# Patient Record
Sex: FEMALE | Race: WHITE | Employment: UNEMPLOYED | URBAN - METROPOLITAN AREA
[De-identification: names, ages, dates, MRNs, and addresses within clinical notes are randomized per-mention and may not be internally consistent; named-entity substitution may affect disease eponyms.]

---

## 2021-05-07 ENCOUNTER — APPOINTMENT (OUTPATIENT)
Dept: GENERAL RADIOLOGY | Facility: HOSPITAL | Age: 46
DRG: 378 | End: 2021-05-07
Attending: EMERGENCY MEDICINE

## 2021-05-07 ENCOUNTER — HOSPITAL ENCOUNTER (INPATIENT)
Facility: HOSPITAL | Age: 46
LOS: 1 days | Discharge: HOME | DRG: 378 | End: 2021-05-08
Attending: EMERGENCY MEDICINE | Admitting: INTERNAL MEDICINE

## 2021-05-07 DIAGNOSIS — K92.2 ACUTE UPPER GI BLEED: ICD-10-CM

## 2021-05-07 DIAGNOSIS — D64.9 ANEMIA, UNSPECIFIED TYPE: Primary | ICD-10-CM

## 2021-05-07 DIAGNOSIS — D50.9 IRON DEFICIENCY ANEMIA, UNSPECIFIED IRON DEFICIENCY ANEMIA TYPE: ICD-10-CM

## 2021-05-07 DIAGNOSIS — K92.1 MELENA: ICD-10-CM

## 2021-05-07 PROCEDURE — 71045 X-RAY EXAM CHEST 1 VIEW: CPT | Performed by: EMERGENCY MEDICINE

## 2021-05-07 PROCEDURE — 99223 1ST HOSP IP/OBS HIGH 75: CPT | Performed by: INTERNAL MEDICINE

## 2021-05-07 RX ORDER — ONDANSETRON 2 MG/ML
4 INJECTION INTRAMUSCULAR; INTRAVENOUS EVERY 6 HOURS PRN
Status: DISCONTINUED | OUTPATIENT
Start: 2021-05-07 | End: 2021-05-07

## 2021-05-07 RX ORDER — POLYETHYLENE GLYCOL 3350 17 G/17G
17 POWDER, FOR SOLUTION ORAL DAILY PRN
Status: DISCONTINUED | OUTPATIENT
Start: 2021-05-07 | End: 2021-05-08

## 2021-05-07 RX ORDER — ACETAMINOPHEN 325 MG/1
650 TABLET ORAL EVERY 6 HOURS PRN
Status: DISCONTINUED | OUTPATIENT
Start: 2021-05-07 | End: 2021-05-08

## 2021-05-07 RX ORDER — ONDANSETRON 2 MG/ML
4 INJECTION INTRAMUSCULAR; INTRAVENOUS EVERY 6 HOURS PRN
Status: DISCONTINUED | OUTPATIENT
Start: 2021-05-07 | End: 2021-05-08

## 2021-05-07 RX ORDER — IBUPROFEN 200 MG
400 TABLET ORAL EVERY 8 HOURS PRN
Status: ON HOLD | COMMUNITY
End: 2021-05-08

## 2021-05-07 RX ORDER — MELATONIN
3 NIGHTLY PRN
Status: DISCONTINUED | OUTPATIENT
Start: 2021-05-07 | End: 2021-05-08

## 2021-05-07 RX ORDER — BISACODYL 10 MG
10 SUPPOSITORY, RECTAL RECTAL
Status: DISCONTINUED | OUTPATIENT
Start: 2021-05-07 | End: 2021-05-08

## 2021-05-07 RX ORDER — SODIUM PHOSPHATE, DIBASIC AND SODIUM PHOSPHATE, MONOBASIC 7; 19 G/133ML; G/133ML
1 ENEMA RECTAL ONCE AS NEEDED
Status: DISCONTINUED | OUTPATIENT
Start: 2021-05-07 | End: 2021-05-08

## 2021-05-07 RX ORDER — SODIUM CHLORIDE, SODIUM LACTATE, POTASSIUM CHLORIDE, CALCIUM CHLORIDE 600; 310; 30; 20 MG/100ML; MG/100ML; MG/100ML; MG/100ML
INJECTION, SOLUTION INTRAVENOUS CONTINUOUS
Status: DISCONTINUED | OUTPATIENT
Start: 2021-05-07 | End: 2021-05-08

## 2021-05-07 RX ORDER — DEXTROSE AND SODIUM CHLORIDE 5; .45 G/100ML; G/100ML
INJECTION, SOLUTION INTRAVENOUS CONTINUOUS
Status: ACTIVE | OUTPATIENT
Start: 2021-05-07 | End: 2021-05-07

## 2021-05-07 NOTE — CONSULTS
[facility]                       Gastroenterology [address] [facility]    Kamilah Goldsmith Patient Status:  Emergency    1975 MRN [id number]   Location [address] [facility] Attending Rodo Umanzor [facility] disease, or inflammatory bowel disease  ROS:  In addition to the pertinent positives described above:             Infectious Disease:  No chronic infections or recent fevers prior to the acute illness            Cardiovascular: No history of CAD, prior MI, obvious depression or anxiety  Labs:   Lab Results   Component Value Date    WBC 18.2 05/07/2021    HGB 8.1 05/07/2021    HCT 23.7 05/07/2021    .0 05/07/2021    CREATSERUM 0.89 05/07/2021    BUN 22 05/07/2021     05/07/2021    K 4.1 05/07/202 above nurse practitioner's history, physical exam, assessment and recommendations with the following modifications and/or additions:     Patient is a 54year old with a history of migraines, with GI consult for anemia and melena.  Pt had a COVID vaccination

## 2021-05-07 NOTE — H&P
[facility]  History and Physical     Amanda [facility] Patient Status:  Emergency    1975 MRN [id number]   Location 656 [facility] Attending Ruben Vallejo, [address] [facility] Day # 0 PCP No primary care provider on file. Known Problems Sister         Allergies:   Cefdinir                DIARRHEA, NAUSEA AND VOMITING    Medications:  No current facility-administered medications on file prior to encounter.   Norethindrone 0.35 MG Oral Tab, Take 1 tablet (0.35 mg total) by adarsh Kinase  No results for input(s): CK in the last 168 hours. Inflammatory Markers  No results for input(s): CRP, MATEO, LDH, DDIMER in the last 168 hours. Imaging: Imaging data reviewed in Epic. ASSESSMENT / PLAN:     1.  Acute UGIB - likely 2/2 incr

## 2021-05-07 NOTE — ED INITIAL ASSESSMENT (HPI)
Pt in c/o SOB and fatigue when doing simple tasks. Pt states she tries to get out of bed and move around and feels tired. Second COVID vaccine Sunday.   No hx blood clots

## 2021-05-07 NOTE — ED PROVIDER NOTES
Patient Seen in: [facility] Emergency Department      History   Patient presents with:  Dizziness  Difficulty Breathing    Stated Complaint: exertional dyspnea and dizziness    HPI/Subjective:   HPI  This is a 69-year-old female with no significant oriented x3, nontoxic appearing. SKIN: Pale, warm, and dry. HEENT:  Pupils equally round and reactive to light. Conjuctiva clear. Oropharynx is clear and moist.   Lungs: Clear to auscultation bilaterally with no rales, no retractions, and no wheezing. In process                 ANTIBODY SCREEN[[id number]]                                  In process                   Please view results for these tests on the individual orders.    [address] Dana[address] Campoverde (BLOOD TYPE)   ANTIBODY SCREEN   Agar GREG Peña Prescribed:  Current Discharge Medication List

## 2021-05-08 ENCOUNTER — ANESTHESIA (OUTPATIENT)
Dept: ENDOSCOPY | Facility: HOSPITAL | Age: 46
DRG: 378 | End: 2021-05-08

## 2021-05-08 ENCOUNTER — ANESTHESIA EVENT (OUTPATIENT)
Dept: ENDOSCOPY | Facility: HOSPITAL | Age: 46
DRG: 378 | End: 2021-05-08

## 2021-05-08 VITALS
DIASTOLIC BLOOD PRESSURE: 60 MMHG | SYSTOLIC BLOOD PRESSURE: 117 MMHG | TEMPERATURE: 98 F | RESPIRATION RATE: 20 BRPM | OXYGEN SATURATION: 100 % | HEART RATE: 105 BPM

## 2021-05-08 PROCEDURE — 0DB68ZX EXCISION OF STOMACH, VIA NATURAL OR ARTIFICIAL OPENING ENDOSCOPIC, DIAGNOSTIC: ICD-10-PCS | Performed by: INTERNAL MEDICINE

## 2021-05-08 PROCEDURE — 0DB58ZX EXCISION OF ESOPHAGUS, VIA NATURAL OR ARTIFICIAL OPENING ENDOSCOPIC, DIAGNOSTIC: ICD-10-PCS | Performed by: INTERNAL MEDICINE

## 2021-05-08 PROCEDURE — 0D748ZZ DILATION OF ESOPHAGOGASTRIC JUNCTION, VIA NATURAL OR ARTIFICIAL OPENING ENDOSCOPIC: ICD-10-PCS | Performed by: INTERNAL MEDICINE

## 2021-05-08 PROCEDURE — 99239 HOSP IP/OBS DSCHRG MGMT >30: CPT | Performed by: INTERNAL MEDICINE

## 2021-05-08 RX ORDER — NALOXONE HYDROCHLORIDE 0.4 MG/ML
80 INJECTION, SOLUTION INTRAMUSCULAR; INTRAVENOUS; SUBCUTANEOUS AS NEEDED
Status: CANCELLED | OUTPATIENT
Start: 2021-05-08 | End: 2021-05-08

## 2021-05-08 RX ORDER — HYDROCODONE BITARTRATE AND ACETAMINOPHEN 10; 325 MG/1; MG/1
2 TABLET ORAL AS NEEDED
Status: CANCELLED | OUTPATIENT
Start: 2021-05-08

## 2021-05-08 RX ORDER — HYDROCODONE BITARTRATE AND ACETAMINOPHEN 10; 325 MG/1; MG/1
1 TABLET ORAL AS NEEDED
Status: CANCELLED | OUTPATIENT
Start: 2021-05-08

## 2021-05-08 RX ORDER — PANTOPRAZOLE SODIUM 40 MG/1
40 TABLET, DELAYED RELEASE ORAL
Status: DISCONTINUED | OUTPATIENT
Start: 2021-05-08 | End: 2021-05-08

## 2021-05-08 RX ORDER — SODIUM CHLORIDE, SODIUM LACTATE, POTASSIUM CHLORIDE, CALCIUM CHLORIDE 600; 310; 30; 20 MG/100ML; MG/100ML; MG/100ML; MG/100ML
INJECTION, SOLUTION INTRAVENOUS CONTINUOUS
Status: CANCELLED | OUTPATIENT
Start: 2021-05-08

## 2021-05-08 RX ORDER — SODIUM CHLORIDE 9 MG/ML
INJECTION, SOLUTION INTRAVENOUS CONTINUOUS PRN
Status: DISCONTINUED | OUTPATIENT
Start: 2021-05-08 | End: 2021-05-08 | Stop reason: SURG

## 2021-05-08 RX ORDER — PANTOPRAZOLE SODIUM 40 MG/1
40 TABLET, DELAYED RELEASE ORAL
Qty: 30 TABLET | Refills: 0 | Status: SHIPPED | OUTPATIENT
Start: 2021-05-08

## 2021-05-08 RX ORDER — METOCLOPRAMIDE HYDROCHLORIDE 5 MG/ML
10 INJECTION INTRAMUSCULAR; INTRAVENOUS AS NEEDED
Status: CANCELLED | OUTPATIENT
Start: 2021-05-08 | End: 2021-05-08

## 2021-05-08 RX ORDER — LIDOCAINE HYDROCHLORIDE 10 MG/ML
INJECTION, SOLUTION EPIDURAL; INFILTRATION; INTRACAUDAL; PERINEURAL AS NEEDED
Status: DISCONTINUED | OUTPATIENT
Start: 2021-05-08 | End: 2021-05-08 | Stop reason: SURG

## 2021-05-08 RX ORDER — ONDANSETRON 2 MG/ML
4 INJECTION INTRAMUSCULAR; INTRAVENOUS AS NEEDED
Status: CANCELLED | OUTPATIENT
Start: 2021-05-08 | End: 2021-05-08

## 2021-05-08 RX ORDER — HYDROMORPHONE HYDROCHLORIDE 1 MG/ML
0.4 INJECTION, SOLUTION INTRAMUSCULAR; INTRAVENOUS; SUBCUTANEOUS EVERY 5 MIN PRN
Status: CANCELLED | OUTPATIENT
Start: 2021-05-08 | End: 2021-05-08

## 2021-05-08 RX ADMIN — SODIUM CHLORIDE: 9 INJECTION, SOLUTION INTRAVENOUS at 08:31:00

## 2021-05-08 RX ADMIN — LIDOCAINE HYDROCHLORIDE 25 MG: 10 INJECTION, SOLUTION EPIDURAL; INFILTRATION; INTRACAUDAL; PERINEURAL at 08:31:00

## 2021-05-08 NOTE — ANESTHESIA PREPROCEDURE EVALUATION
PRE-OP EVALUATION    Patient Name: Shilpi Hackett    Admit Diagnosis: Acute upper GI bleed [K92.2]  Anemia, unspecified type [D64.9]    Pre-op Diagnosis: Melena [K92.1]  Iron deficiency anemia, unspecified iron deficiency anemia type [D50.9]    ESOPHAG reviewed.     Anesthetic Complications           GI/Hepatic/Renal      (+) GERD                           Cardiovascular                (+) obesity                                       Endo/Other                                  Pulmonary

## 2021-05-08 NOTE — OPERATIVE REPORT
EGD operative report  Patient Name: Dorene Wan  Procedure: Esophagogastroduodenoscopy with cold forceps biopsy  Date of procedure: 5/8/2021    Indication: Melena, acute blood loss anemia  Attending: Chantal Moulton M.D.   Consent:  The eduardo esteban from the incisors. Resistance to passage of the endoscope was noted. With mild pressure, the endoscope was able to be advanced into the stomach. This left a small linear rent at the EGJxn, consistent with dilation of the stricture with the endoscope.  Co

## 2021-05-08 NOTE — PLAN OF CARE
Patient received this evening alert and oriented x 4, lungs clear on room air, no cough or SOB noted, abdomen soft, bowel sounds present, passing flatus, voiding adequate amounts, asymptomatic, patient medicated for complaints of headache, IVF infusing, me

## 2021-05-08 NOTE — DISCHARGE SUMMARY
[facility]  DISCHARGE SUMMARY     Davide Andrew Patient Status:  Inpatient    1975 MRN [id number]   [facility] 4NW-A Attending Malinda Early Day # 1 PCP None Pcp     Date of Admission: 2021  Date of D recommendations (brief descriptions):  • As above    Lab/Test results pending at Discharge:   · Final pathology results    Consultants:  • GI    Discharge Medication List:     Discharge Medications      START taking these medications      Instructions Pres edema.  -----------------------------------------------------------------------------------------------  PATIENT DISCHARGE INSTRUCTIONS: See electronic chart    Madhu Ramos, DO    Time spent:  > 30 minutes

## 2021-05-08 NOTE — PLAN OF CARE
Admitted from ED, alert and oriented, no distress, hx of abdominal cramps and tarry stools prior to admission, also experiencing fatigued, latest HGB was 8.1, seen by GI in ED, for EGD in the am. NPO, IV fluids.    Problem: GASTROINTESTINAL - ADULT  Goal: M

## 2021-05-08 NOTE — PLAN OF CARE
Patient down to endo via cart and transport. Jewelry removed and given to visitor Sheryl Galdamez at bedside- patient in agreement. NPO per previous shift report.      Patient returned to room from [facility] at approx 1000 via cart and transport, transferred to bed, fall

## 2021-05-08 NOTE — PLAN OF CARE
Patient cleared for DC from all services. Patient in agreement with DC plan. IV removed. Patient instructed to  protonix from her [facility]. Medication discussed. AVS reviewed at length. All questions answered.  Patient discharged at approx 1410 i

## 2021-05-08 NOTE — ANESTHESIA POSTPROCEDURE EVALUATION
[address] Danae Del Valle Patient Status:  Inpatient   Age/Gender 54year old female MRN [id number]   Location [address] 28 Attending Ruma Singh, 160[address] [facility] Day # 1 PCP None Pcp       Anesthesia Post-op Note

## 2023-01-20 ENCOUNTER — HOSPITAL ENCOUNTER (OUTPATIENT)
Age: 48
Discharge: HOME | End: 2023-01-20
Attending: EMERGENCY MEDICINE
Payer: COMMERCIAL

## 2023-01-20 VITALS
WEIGHT: 200 LBS | DIASTOLIC BLOOD PRESSURE: 87 MMHG | TEMPERATURE: 98 F | RESPIRATION RATE: 18 BRPM | HEART RATE: 91 BPM | HEIGHT: 64 IN | SYSTOLIC BLOOD PRESSURE: 123 MMHG | BODY MASS INDEX: 34.15 KG/M2 | OXYGEN SATURATION: 99 %

## 2023-01-20 DIAGNOSIS — T14.8XXA HEMATOMA: Primary | ICD-10-CM

## 2023-01-20 PROCEDURE — 99213 OFFICE O/P EST LOW 20 MIN: CPT

## 2023-01-20 PROCEDURE — 99212 OFFICE O/P EST SF 10 MIN: CPT

## 2023-01-20 NOTE — DISCHARGE INSTRUCTIONS
Tylenol or Advil as needed apply ice 20 minutes 4 times a day follow-up with your doctor or return for worsening symptoms

## 2023-01-20 NOTE — ED INITIAL ASSESSMENT (HPI)
Pt states she donated blood yesterday and has noticed some lt upper arm swelling/bruising. States she did receive a bolus of NS after infusion. C/o pain to arm.

## (undated) DEVICE — Device: Brand: DEFENDO AIR/WATER/SUCTION AND BIOPSY VALVE

## (undated) DEVICE — 1200CC GUARDIAN II: Brand: GUARDIAN

## (undated) DEVICE — ESOPHAGEAL BALLOON DILATATION CATHETER: Brand: CRE FIXED WIRE

## (undated) DEVICE — FORCEP BIOPSY RJ4 LG CAP W/ND

## (undated) DEVICE — FILTERLINE NASAL ADULT O2/CO2

## (undated) DEVICE — SYRINGE/GUAGE ASSEMBLY

## (undated) DEVICE — 3M™ RED DOT™ MONITORING ELECTRODE WITH FOAM TAPE AND STICKY GEL, 50/BAG, 20/CASE, 72/PLT 2570: Brand: RED DOT™

## (undated) DEVICE — ENDOSCOPY PACK UPPER: Brand: MEDLINE INDUSTRIES, INC.

## (undated) NOTE — LETTER
[facility] [address], [address] [place] [address]    Consent for Anesthesia   1.    Reji Carranza agree to be cared for by a physician anesthesiologist alone and/or with a nurse anesthetist, who is specially trained to monitor me and give allergic reactions to medications, injury to my airway, heart, lungs, vision, nerves, or muscles and in extremely rare instances death. 5. My doctor has explained to me other choices available to me for my care (alternatives).   6. Pregnant Patients (“epid Printed: 5/8/2021 at 8:09 AM    Medical Record #: [id number]                                            Page 1 of 1

## (undated) NOTE — LETTER
Danae Goldberg [address] [facility]  [place], [address] [facility]    Consent for Operation  Date: __________________    Time: _______________    1.  I authorize the performance upon Sarina Mcardle the following operation:    ESOPHAGOGASTRODUODENOSCOPY (EGD) videotape. [facility] will not be responsible for storage or maintenance of this tape. 6. For the purpose of advancing medical education, I consent to the admittance of observers to the Operating Room.     7. I authorize the use of any specimen, organs ___________________________    When the patient is a minor or mentally incompetent to give consent:  Signature of person authorized to consent for patient: ___________________________   Relationship to patient: _____________________________________________

## (undated) NOTE — LETTER
Rosalee Moreno MD        I acknowledge that I have been informed of the risk involved by refusing the urine pregnancy test on [address]     I, thereb